# Patient Record
Sex: FEMALE | Race: BLACK OR AFRICAN AMERICAN | NOT HISPANIC OR LATINO | URBAN - NONMETROPOLITAN AREA
[De-identification: names, ages, dates, MRNs, and addresses within clinical notes are randomized per-mention and may not be internally consistent; named-entity substitution may affect disease eponyms.]

---

## 2019-01-09 ENCOUNTER — IMPORTED ENCOUNTER (OUTPATIENT)
Dept: URBAN - NONMETROPOLITAN AREA CLINIC 1 | Facility: CLINIC | Age: 49
End: 2019-01-09

## 2019-01-09 PROBLEM — H52.13: Noted: 2019-01-09

## 2019-01-09 PROBLEM — H52.223: Noted: 2019-01-09

## 2019-01-09 PROCEDURE — 92310 CONTACT LENS FITTING OU: CPT

## 2019-01-09 PROCEDURE — 92015 DETERMINE REFRACTIVE STATE: CPT

## 2019-01-09 PROCEDURE — 92004 COMPRE OPH EXAM NEW PT 1/>: CPT

## 2019-01-09 NOTE — PATIENT DISCUSSION
*[facility]:. -  A new spectacle prescription was created and dispensed today. S/P CORNEAL GRAFT OSorder trials then pt to  try then call for rx

## 2022-04-09 ASSESSMENT — TONOMETRY
OD_IOP_MMHG: 15
OS_IOP_MMHG: 15

## 2022-04-09 ASSESSMENT — VISUAL ACUITY: OD_SC: 20/30
